# Patient Record
Sex: FEMALE | Race: WHITE | NOT HISPANIC OR LATINO | Employment: FULL TIME | URBAN - METROPOLITAN AREA
[De-identification: names, ages, dates, MRNs, and addresses within clinical notes are randomized per-mention and may not be internally consistent; named-entity substitution may affect disease eponyms.]

---

## 2017-09-07 ENCOUNTER — OFFICE VISIT (OUTPATIENT)
Dept: URGENT CARE | Facility: CLINIC | Age: 22
End: 2017-09-07
Payer: COMMERCIAL

## 2017-09-07 VITALS
WEIGHT: 120 LBS | DIASTOLIC BLOOD PRESSURE: 68 MMHG | HEART RATE: 66 BPM | OXYGEN SATURATION: 100 % | HEIGHT: 65 IN | RESPIRATION RATE: 18 BRPM | TEMPERATURE: 98 F | BODY MASS INDEX: 19.99 KG/M2 | SYSTOLIC BLOOD PRESSURE: 102 MMHG

## 2017-09-07 DIAGNOSIS — S90.851A FOREIGN BODY IN RIGHT FOOT, INITIAL ENCOUNTER: Primary | ICD-10-CM

## 2017-09-07 PROCEDURE — 3008F BODY MASS INDEX DOCD: CPT | Mod: S$GLB,,, | Performed by: NURSE PRACTITIONER

## 2017-09-07 PROCEDURE — 99203 OFFICE O/P NEW LOW 30 MIN: CPT | Mod: S$GLB,,, | Performed by: NURSE PRACTITIONER

## 2017-09-07 RX ORDER — MUPIROCIN 20 MG/G
OINTMENT TOPICAL
Qty: 22 G | Refills: 1 | Status: SHIPPED | OUTPATIENT
Start: 2017-09-07 | End: 2018-05-15

## 2017-09-07 NOTE — PATIENT INSTRUCTIONS
Foreign Object Under the Skin (Removed)  An object has been removed from under your skin. Although care was taken to remove all of it, there is always a chance that a small piece may have been left behind.  Most skin wounds heal without problems. But there can be an increased risk of infection if anything stays under the skin. Sometimes the pieces work their way out on their own, and sometimes they can cause an infection. Very small pieces that stay under the skin usually dont cause a problem or need further treatment.  Home care  Wound care  · Keep the wound clean and dry.  · If there is a dressing or bandage, change it when it gets wet or dirty. Otherwise, leave it on for the first 24 hours, then change it once a day or as often as you were instructed.  · If stitches or staples were used, clean the wound every day:  ¨ After taking off the dressing, wash the area gently with soap and water.  ¨ Apply a thin layer of antibiotic ointment to the cut. This will keep the wound clean and make it easier to remove the stitches. If it is oozing a lot, you can put a nonstick dressing over it. Then reapply the bandage or dressing as you were instructed.  ¨ You can get it wet, just like when you clean it. This means you can shower as usual for the first 24 hours, but do not soak the area in water (no baths or swimming) until the stitches or staples are taken out.  · If surgical tape or strips were used, keep the area clean and dry. If it becomes wet, blot it dry with a towel.    Medicine  · You can take over-the-counter medicine for pain, unless you were given a different pain medicine to use. If you have chronic liver or kidney disease or ever had a stomach ulcer, or gastrointestinal bleeding or are taking blood thinner medicines, talk with your healthcare provider before using these medicines.  · If you were given antibiotics, take them until they are used up. It is important to finish the antibiotics even if the wound  looks better to make sure the infection clears.  Follow-up care  Follow up your healthcare provider, or as advised. Keep in mind the following:  · Watch for any signs of infection, such as increasing pain, redness, swelling, or pus drainage. If this happens, dont wait for your scheduled visit, rather see your healthcare provider sooner.  · Stitches or staples are usually taken out within 5 to 14 days. This varies depending on what part of your body they are on, and the type of wound. The healthcare provider will tell you how long they should be left in.  · If surgical tape or strips were used, they are usually left on for 7 to 10 days. You can remove them after that unless you were told otherwise. If you try to remove them, and it is too difficult, soaking can help. If the edges of the cut pull apart, then stop removing the tape, and follow up with your healthcare provider.  · If X-rays were taken, you will be told if there are new findings that may affect your care.  When to seek medical care  Call your healthcare provider right away if any of these occur:  · Fever of 100.4ºF (38ºC) or higher, or as directed by your healthcare provider  · Increasing pain in the wound  · Redness, swelling or pus coming from the wound  Date Last Reviewed: 10/1/2016  © 6455-9530 [employer]. [address] [place] [address], [place] [address]. All rights reserved. This information is not intended as a substitute for professional medical care. Always follow your healthcare professional's instructions.

## 2017-09-07 NOTE — PROGRESS NOTES
Subjective:       Patient ID: Dori Wynne is a 21 y.o. female.    Chief Complaint: Foreign Body (RT FOOT )  glass in RT   PATIENT REPORTS THAT A GLASS BOWL BROKE AND SHE WAS WALKING BAREFOOT AND A PIECE OF GLASS STUCK IN HER FOOT      Foreign Body   The incident occurred 1 to 3 hours ago. Suspected object: RT FOOT  The incident was witnessed/reported by the patient. Pertinent negatives include no difficulty breathing, drainage, fever or sore throat. There is no history of a prior foreign body removal.     Review of Systems   Constitution: Negative for chills and fever.   HENT: Negative for sore throat.    Respiratory: Negative for shortness of breath.    Skin: Negative for itching and rash.   Musculoskeletal: Negative for joint pain.       Objective:      Physical Exam   Constitutional: She is oriented to person, place, and time. She appears well-developed and well-nourished.   HENT:   Head: Normocephalic and atraumatic. Head is without abrasion, without contusion and without laceration.   Right Ear: External ear normal.   Left Ear: External ear normal.   Nose: Nose normal.   Mouth/Throat: Oropharynx is clear and moist.   Eyes: Conjunctivae, EOM and lids are normal. Pupils are equal, round, and reactive to light.   Neck: Trachea normal, full passive range of motion without pain and phonation normal. Neck supple.   Cardiovascular: Normal rate, regular rhythm and normal heart sounds.    Pulmonary/Chest: Effort normal and breath sounds normal. No stridor. No respiratory distress.   Musculoskeletal: Normal range of motion.        Right foot: There is tenderness.        Feet:    Neurological: She is alert and oriented to person, place, and time.   Skin: Skin is warm, dry and intact. Capillary refill takes less than 2 seconds. No abrasion, no bruising, no burn, no ecchymosis, no laceration, no lesion and no rash noted. No erythema.   Psychiatric: She has a normal mood and affect. Her speech is normal and  behavior is normal. Judgment and thought content normal. Cognition and memory are normal.   Nursing note and vitals reviewed.      Assessment:       1. Foreign body in right foot, initial encounter        Plan:       Dori was seen today for foreign body.    Diagnoses and all orders for this visit:    Foreign body in right foot, initial encounter  -     mupirocin (BACTROBAN) 2 % ointment; Apply to affected area 2 times daily    Please return here or go to the Emergency Department for any concerns or worsening of condition.  If you were prescribed antibiotics, please take them to completion.  If you were prescribed a narcotic medication, do not drive or operate heavy equipment or machinery while taking these medications.  Please follow up with your primary care doctor or specialist as needed.    If you  smoke, please stop smoking.

## 2017-09-13 ENCOUNTER — TELEPHONE (OUTPATIENT)
Dept: URGENT CARE | Facility: CLINIC | Age: 22
End: 2017-09-13

## 2018-05-15 ENCOUNTER — CLINICAL SUPPORT (OUTPATIENT)
Dept: CARDIOLOGY | Facility: CLINIC | Age: 23
End: 2018-05-15
Payer: COMMERCIAL

## 2018-05-15 ENCOUNTER — OFFICE VISIT (OUTPATIENT)
Dept: CARDIOLOGY | Facility: CLINIC | Age: 23
End: 2018-05-15
Payer: COMMERCIAL

## 2018-05-15 VITALS
HEART RATE: 50 BPM | WEIGHT: 127.44 LBS | DIASTOLIC BLOOD PRESSURE: 62 MMHG | OXYGEN SATURATION: 100 % | BODY MASS INDEX: 21.23 KG/M2 | HEIGHT: 65 IN | SYSTOLIC BLOOD PRESSURE: 101 MMHG

## 2018-05-15 DIAGNOSIS — R01.1 UNDIAGNOSED CARDIAC MURMURS: ICD-10-CM

## 2018-05-15 DIAGNOSIS — R01.1 UNDIAGNOSED CARDIAC MURMURS: Primary | ICD-10-CM

## 2018-05-15 PROCEDURE — 93303 ECHO TRANSTHORACIC: CPT | Mod: S$GLB,,, | Performed by: PEDIATRICS

## 2018-05-15 PROCEDURE — 93325 DOPPLER ECHO COLOR FLOW MAPG: CPT | Mod: S$GLB,,, | Performed by: PEDIATRICS

## 2018-05-15 PROCEDURE — 93000 ELECTROCARDIOGRAM COMPLETE: CPT | Mod: 59,S$GLB,, | Performed by: PEDIATRICS

## 2018-05-15 PROCEDURE — 93320 DOPPLER ECHO COMPLETE: CPT | Mod: S$GLB,,, | Performed by: PEDIATRICS

## 2018-05-15 PROCEDURE — 99204 OFFICE O/P NEW MOD 45 MIN: CPT | Mod: S$GLB,,, | Performed by: PEDIATRICS

## 2018-05-15 PROCEDURE — 3008F BODY MASS INDEX DOCD: CPT | Mod: CPTII,S$GLB,, | Performed by: PEDIATRICS

## 2018-05-15 NOTE — PROGRESS NOTES
"  To Dr Lencho Buckner:          HISTORY OF PRESENT ILLNESS:  Dori Wynne, is a 22 -year-old female, who has been in good health.  She is a senior attending [employer].  Dori comes to clinic today with her mother, for evaluation of a heart murmur. She has no ongoing medical problems, except for occasional lateral left-rib pain.  She is active physically and runs cross country.  Recently, on a routine physical examination, you heard a heart murmur. Dori reports no problem with SOB, breathing or chest pain.  She does experience occasional palpitations, and, in May, had a single episode of a short-lived but abnormally-rapid heart rate.  There was no associated lightheadedness or syncope. She does not take energy drinks or excessive caffeinated sodas.  Mom does report that as a child, Dori at times complained of "her heart beating fast".          REVIEW OF SYSTEMS:  There are no visual disturbances or hearing deficits.  No seizures.  No headaches. No problem with excessive coughing, wheezing or URIs.  No swallowing difficulty.  No abdominal pain and bowel movements are normal.  No pelvic pain and urination is normal. No DM or thyroid disease. No bruising or bleeding. No known intrinsic problem with the lungs, liver or kidneys. No arterial disease, HBP or lipid disorder. No FH of rhythm problems or congenital heart disease.             PHYSICAL EXAMINATION:   GENERAL:  A healthy-looking pleasant 22 -year-old, who is noncyanotic and in no distress. VITAL SIGNS:  Her weight is 57.8 kg or 127 pounds 7 ounces.  Her height is 1.651 meters or 5 feet 5 inches.  Heart rate is 50 beats per minute and regular. Pulse oximetry is normal at 100 %.  Blood pressure in the right arm is 101/62.  Color and perfusion are good.  HEENT:  There are normal eye movements. There are no bruits over the head.  The mucous membranes are moist and pink.  There is no adenopathy.  No jugular venous distention. NECK:  Supple. The thyroid is not enlarged. No " carotid bruits.   CHEST:  Normal chest movements.  Breath sounds are good bilaterally.  Good air entry.  No wheezes, rhonchi, or rales.  CARDIOVASCULAR:  Normal precordial activity.  S1 is normal and S2 is split. There is a grade I/VI vibratory systolic ejection murmur heard in the periapical region.  Diastole is clear.  There is no rub, gallop or click.  ABDOMEN:  Liver edge is at the right costal margin and nontender.  ? Spleen.  No masses are palpable.  Bowel sounds are normal.  EXTREMITIES:  Pulses are 3+ throughout.  Capillary refill is good.  There is no peripheral edema.  No joint pain. No bruising, cyanosis or clubbing.           ............................................................................................................................................................           ECG:  Sinus bradycardia with a ventricular rate is 54 bpm. Note,  rsr' in V1 and V2 suggestive of an RV conduction delay. Normal T waves (early repolarization).           ECHO:    No pericardial effusion. There are four normally related cardiac chambers.  No clots or vegetations.  No atrial septal defect or ventricular septal defect.  No RVOT or LVOT obstruction. The cardiac valves appear normal. No MVP.  No arch obstruction.  M-Mode Measurements. The left ventricular internal diastolic dimension is 4.6 cm.  The right ventricle is 1.8 cm.  The interventricular septal thickness during diastole is 0.7 cm and the posterior wall is 0.9 cm.  Ejection fraction is completely normal at 66 %.  The Ao root is 2.6 cm. The LA is 3.4 cm.  All are normal.  Doppler Analysis:  There is a mild degree of tricuspid insufficiency.  Peak pressure drop is ~ 23 mmHg. Trace pulmonary insufficiency.  Peak pressure drop across the pulmonary valve is 4 mmHg.  No mitral insufficiency. There no aortic insufficiency. Peak pressure drop across the aortic valve is 7 mmHg.   No PDA seen.   PVs are to the  LA.        ...................................................................................................................................................................           ASSESSMENT:  In summary, we have a 22-year-old female [employer], Dori Wynne,  who has been in good health. She is seen today, 5/15/18, for evaluation of a heart murmur. She is active physically and has no limitations.  At today's visit, I agree she has a heart murmur.  It appeats to be functional.  However, her ECG was somewhat abnormal, with an rsr' in V1 and V2, suggesting a conduction delay across the RV. This pattern can sometimes be seen with an ASD.  Although, it can be a variant of normal. Also, given her complaint of occasional palpitations, an ECHO was performed. It showed normal intracardiac anatomy, with no ASD and the RV is not dilated.  These findings are consistent with a functional murmur.              PLAN:  1.  Antibiotic prophylaxis is not required in this situation.   2.  I have told Dori and mom to please inform us if the palpitations and/or a rapid HR persist.  If so, we can provide them with an Event Recorder.   If there are any questions concerning the cardiac status of this patient, please feel free to contact us.  Thank you so much for allowing us to partake in the care of your patient.  Sincerely Juan Conroy PhD, MD.

## 2018-05-16 ENCOUNTER — OFFICE VISIT (OUTPATIENT)
Dept: GASTROENTEROLOGY | Facility: CLINIC | Age: 23
End: 2018-05-16
Payer: COMMERCIAL

## 2018-05-16 ENCOUNTER — TELEPHONE (OUTPATIENT)
Dept: TRANSPLANT | Facility: CLINIC | Age: 23
End: 2018-05-16

## 2018-05-16 VITALS
WEIGHT: 125.69 LBS | SYSTOLIC BLOOD PRESSURE: 108 MMHG | HEART RATE: 57 BPM | BODY MASS INDEX: 20.94 KG/M2 | HEIGHT: 65 IN | DIASTOLIC BLOOD PRESSURE: 62 MMHG

## 2018-05-16 DIAGNOSIS — R10.12 LEFT UPPER QUADRANT PAIN: ICD-10-CM

## 2018-05-16 DIAGNOSIS — R93.2 ABNORMAL LIVER ULTRASOUND: Primary | ICD-10-CM

## 2018-05-16 PROCEDURE — 99999 PR PBB SHADOW E&M-EST. PATIENT-LVL III: CPT | Mod: PBBFAC,,, | Performed by: INTERNAL MEDICINE

## 2018-05-16 PROCEDURE — 99203 OFFICE O/P NEW LOW 30 MIN: CPT | Mod: S$GLB,,, | Performed by: INTERNAL MEDICINE

## 2018-05-16 NOTE — PROGRESS NOTES
Subjective:      Patient ID: Dori Wynne is a 22 y.o. female.    Chief Complaint: lesion in liver    HPI:   Patient a 22-year-old female presents for GI opinion.  Due to some intermittent left upper quadrant discomfort she will underwent an ultrasound the abdomen.  A small echogenic lesion in the right hepatic lobe was noted.  It was felt to most likely represent a hemangioma.  No other significant abdomen ALLERGIES.  Patient [place] on [place] trip in [facility]  Patient's abdominal discomfort is intermittent and never severe.  Her past medical history is unremarkable.  Liver enzymes are essentially normal.  CBC normal.  C-reactive protein normal.  Nonsmoker.  Nondrinker.  Family history positive for colon cancer in an aunt and uncle.    Review of patient's allergies indicates:  No Known Allergies  Past Medical History:   Diagnosis Date    Vaginal spotting age 11     Past Surgical History:   Procedure Laterality Date    mole removed      wisdom       Family History   Problem Relation Age of Onset    Hypertension Maternal Grandmother     Diabetes Maternal Grandfather     Hypertension Maternal Grandfather     Cancer Maternal Grandfather         pancreatis cancer    Colon polyps Maternal Grandfather     Pancreatic cancer Maternal Grandfather     Hypertension Father     Hypertension Mother     Diabetes Maternal Aunt     Cancer Maternal Aunt     Colon cancer Maternal Aunt     Stomach cancer Maternal Aunt     Diabetes Cousin     Cancer Cousin     Colon cancer Maternal Uncle     Kidney cancer Maternal Uncle     Lymphoma Maternal Aunt      labor Neg Hx     Miscarriages / Stillbirths Neg Hx      Social History     Social History    Marital status: Single     Spouse name: N/A    Number of children: N/A    Years of education: N/A     Occupational History    Not on file.     Social History Main Topics    Smoking status: Never Smoker    Smokeless  "tobacco: Never Used    Alcohol use No    Drug use: No    Sexual activity: No     Other Topics Concern    Not on file     Social History Narrative    No narrative on file       Review of Systems:  Constitutional: Negative for appetite change.   HENT: Negative for trouble swallowing.   Eyes: Negative for photophobia.   Respiratory: Negative for cough and shortness of breath.   Cardiovascular: Positive for palpitations.  She has undergone a cardiac echo  Gastrointestinal: See HPI for details.  Genitourinary: Negative for frequency and hematuria.   Skin: Negative for rash.   Neurological: Negative for weakness and headaches.   Hematological: Negative.   Psychiatric/Behavioral: Negative for suicidal ideas and behavioral problems.     Objective:     /62 (BP Location: Right arm, Patient Position: Sitting)   Pulse (!) 57   Ht 5' 5" (1.651 m)   Wt 57 kg (125 lb 10.6 oz)   BMI 20.91 kg/m²     Physical Exam:  Alert no distress.  Eyes: Pupils are equal, round, and reactive to light.   Neck: Supple. No mass  Cardiovascular: Regular rhythm . No murmur   Pulmonary/Chest: Lungs clear   Abdominal: Soft. No mass palpated. Nontender, no guarding. Positive bowel sounds   Musculoskeletal: No deformity. No edema.   Psychiatric: Alert and oriented    Assessment:     1. Abnormal liver ultrasound    2. Left upper quadrant pain      Plan:     Dori was seen today for lesion in liver.    Diagnoses and all orders for this visit:    Abnormal liver ultrasound    Left upper quadrant pain      Plan:  Reassurance.  Repeat ultrasound in 3-6 months to assure stability of the lesion.  I discussed this recommendation at length with the patient and her mother.  Provided a pamphlet on precautions for avoidance of traveler's diarrhea    "

## 2018-05-16 NOTE — TELEPHONE ENCOUNTER
----- Message from Shannan Garcia sent at 5/16/2018  3:04 PM CDT -----  Contact: Mother-   Returned call to pt mother , but there was no answer. LVM for her to call back; the pt is [facility]ed to see [facility] for the liver lesion, but GI ussally do not take care of problems with the liver.  ----- Message -----  From: Jaun Santo  Sent: 5/16/2018  11:42 AM  To: [facility] Referral Pool    Schedule Appointment.     Reason: (I.e. Caller declined first available, appointment listed below. Caller will not accept being placed on the waitlist and is requesting a message be sent to doctor, etc.)    Name of Caller:Mother   When is the first available appointment?n/a  Symptoms:n/a  Communication Preference (MyChart response to Pt. (or) Call Back # and timeframe):  [phone]   Additional Information:Would like someone to call her, regarding patient lesion on liver. She will be a NP with [employer], states she will MD top get patient referral faxed.

## 2018-05-16 NOTE — PATIENT INSTRUCTIONS
Abdominal Ultrasound  An abdominal ultrasound is an imaging test that uses sound waves to form pictures of your abdominal organs. It can help find organ problems, such as gallstones, kidney stones, or liver disease. Ultrasound does not use ionizing radiation and does not have any known risks. It can also see many blood vessels in the abdomen. If needed as part of your exam, the blood flow in these blood vessels can also be evaluated.  Before your test  · What you need to do to get ready for the test depends on the area of your body that will be looked at. Follow any directions youre given for not eating or drinking before the procedure. Your healthcare provider will give you instructions if required.  · Follow all other instructions given by your provider.  For best results, be prepared to answer questions about your medical history, including the following:  · Previous abdominal surgery  · Previous abdominal imaging tests, including ultrasound, CT, or MRI studies  During your test  · You may be asked to put on a gown.  · You will lie on an exam table with your abdomen exposed.  · A nongreasy gel will be put on your skin.  · The sonographer will use a handheld probe (transducer) against your abdomen. This probe helps create images of your abdominal organs.  · You may see the pictures of your organs on screen.  · Certain organs, like the liver, can be biopsied during an ultrasound. This will require additional steps and your provider can discuss these details with you.  The person who performs the ultrasound is called a sonographer. The sonographer can answer questions about the test. But only a doctor can explain the results.    Your test results  Your healthcare provider will discuss the test results with you during a follow-up visit or over the phone. Your next appointment is: _________________  Date Last Reviewed: 2/1/2017  © 3961-6927 [employer]. [address] [place] [address], [place] [address]. All  rights reserved. This information is not intended as a substitute for professional medical care. Always follow your healthcare professional's instructions.

## 2020-05-14 ENCOUNTER — LAB VISIT (OUTPATIENT)
Dept: PRIMARY CARE CLINIC | Facility: CLINIC | Age: 25
End: 2020-05-14
Payer: COMMERCIAL

## 2020-05-14 DIAGNOSIS — R05.9 COUGH: Primary | ICD-10-CM

## 2020-05-14 PROCEDURE — U0002 COVID-19 LAB TEST NON-CDC: HCPCS

## 2020-05-15 LAB — SARS-COV-2 RNA RESP QL NAA+PROBE: NOT DETECTED

## 2022-03-29 ENCOUNTER — TELEPHONE (OUTPATIENT)
Dept: OBSTETRICS AND GYNECOLOGY | Facility: CLINIC | Age: 27
End: 2022-03-29
Payer: COMMERCIAL

## 2022-03-29 NOTE — TELEPHONE ENCOUNTER
----- Message from Vee Paredes LPN sent at 3/29/2022 11:38 AM CDT -----  Regarding: FW: Patient call back  Is this appointment considered a procedure or a birth control consultation (15 or 30 min)?  ----- Message -----  From: Sia Nesbitt  Sent: 3/29/2022  11:06 AM CDT  To: Armida SCALES Staff  Subject: Patient call back                                Who called: Caron (mother)     What is the request in detail: Patient is requesting a call back. Patient needs a diaphragm fit and would like to discuss how long that would take to get in. She would like to further discuss.   Please advise.    Can the clinic reply by [facility]? No    Best call back number: [phone]    Additional Information: N/A

## 2022-03-29 NOTE — TELEPHONE ENCOUNTER
Called patient no answer to assist with scheduling under a procedure visit with Dr Huffman for a diaphragm fit.

## 2022-05-16 ENCOUNTER — TELEPHONE (OUTPATIENT)
Dept: OBSTETRICS AND GYNECOLOGY | Facility: CLINIC | Age: 27
End: 2022-05-16
Payer: COMMERCIAL

## 2022-05-16 NOTE — TELEPHONE ENCOUNTER
Called and spoke with patients mother about patient wanting to get fitted for a diaphragm at 06/06/22 visit. Informed patient that yes Dr Huffman does do fitting and will discuss if this can happen at her first new patient appointment and will let her know.

## 2022-05-16 NOTE — TELEPHONE ENCOUNTER
----- Message from Stella Islas sent at 5/16/2022  2:34 PM CDT -----  Regarding: questions and concerns  Name of Who is Calling: Santi, mother           What is the request in detail: Santi is requesting a call in regards to patient getting fit for diaphragm. She want to know if it can done the same day as her appointment. She want to change the appointment as well.           Can the clinic reply by [facility]:No            What Number to Call Back if not in [facility]: [phone]

## 2022-05-17 ENCOUNTER — TELEPHONE (OUTPATIENT)
Dept: OBSTETRICS AND GYNECOLOGY | Facility: CLINIC | Age: 27
End: 2022-05-17
Payer: COMMERCIAL

## 2022-05-17 NOTE — TELEPHONE ENCOUNTER
----- Message from Aparna Eugene sent at 5/17/2022  2:15 PM CDT -----  Regarding: Return Call  Name of Who is Calling:Caron Wynne (Mother)           What is the request in detail: Patient mother would like a call back           Can the clinic reply by [facility]: No           What Number to Call Back [facility] not [facility]:[phone]

## 2022-06-06 ENCOUNTER — OFFICE VISIT (OUTPATIENT)
Dept: OBSTETRICS AND GYNECOLOGY | Facility: CLINIC | Age: 27
End: 2022-06-06
Attending: OBSTETRICS & GYNECOLOGY
Payer: COMMERCIAL

## 2022-06-06 VITALS
HEART RATE: 59 BPM | HEIGHT: 65 IN | DIASTOLIC BLOOD PRESSURE: 73 MMHG | WEIGHT: 121.63 LBS | SYSTOLIC BLOOD PRESSURE: 116 MMHG | BODY MASS INDEX: 20.26 KG/M2

## 2022-06-06 DIAGNOSIS — Z30.09 GENERAL COUNSELING AND ADVICE ON FEMALE CONTRACEPTION: Primary | ICD-10-CM

## 2022-06-06 PROCEDURE — 3008F PR BODY MASS INDEX (BMI) DOCUMENTED: ICD-10-PCS | Mod: CPTII,S$GLB,, | Performed by: OBSTETRICS & GYNECOLOGY

## 2022-06-06 PROCEDURE — 1159F MED LIST DOCD IN RCRD: CPT | Mod: CPTII,S$GLB,, | Performed by: OBSTETRICS & GYNECOLOGY

## 2022-06-06 PROCEDURE — 3078F PR MOST RECENT DIASTOLIC BLOOD PRESSURE < 80 MM HG: ICD-10-PCS | Mod: CPTII,S$GLB,, | Performed by: OBSTETRICS & GYNECOLOGY

## 2022-06-06 PROCEDURE — 1160F RVW MEDS BY RX/DR IN RCRD: CPT | Mod: CPTII,S$GLB,, | Performed by: OBSTETRICS & GYNECOLOGY

## 2022-06-06 PROCEDURE — 3078F DIAST BP <80 MM HG: CPT | Mod: CPTII,S$GLB,, | Performed by: OBSTETRICS & GYNECOLOGY

## 2022-06-06 PROCEDURE — 99213 OFFICE O/P EST LOW 20 MIN: CPT | Mod: S$GLB,,, | Performed by: OBSTETRICS & GYNECOLOGY

## 2022-06-06 PROCEDURE — 3074F SYST BP LT 130 MM HG: CPT | Mod: CPTII,S$GLB,, | Performed by: OBSTETRICS & GYNECOLOGY

## 2022-06-06 PROCEDURE — 99999 PR PBB SHADOW E&M-EST. PATIENT-LVL III: ICD-10-PCS | Mod: PBBFAC,,, | Performed by: OBSTETRICS & GYNECOLOGY

## 2022-06-06 PROCEDURE — 99213 PR OFFICE/OUTPT VISIT, EST, LEVL III, 20-29 MIN: ICD-10-PCS | Mod: S$GLB,,, | Performed by: OBSTETRICS & GYNECOLOGY

## 2022-06-06 PROCEDURE — 99999 PR PBB SHADOW E&M-EST. PATIENT-LVL III: CPT | Mod: PBBFAC,,, | Performed by: OBSTETRICS & GYNECOLOGY

## 2022-06-06 PROCEDURE — 1159F PR MEDICATION LIST DOCUMENTED IN MEDICAL RECORD: ICD-10-PCS | Mod: CPTII,S$GLB,, | Performed by: OBSTETRICS & GYNECOLOGY

## 2022-06-06 PROCEDURE — 3074F PR MOST RECENT SYSTOLIC BLOOD PRESSURE < 130 MM HG: ICD-10-PCS | Mod: CPTII,S$GLB,, | Performed by: OBSTETRICS & GYNECOLOGY

## 2022-06-06 PROCEDURE — 3008F BODY MASS INDEX DOCD: CPT | Mod: CPTII,S$GLB,, | Performed by: OBSTETRICS & GYNECOLOGY

## 2022-06-06 PROCEDURE — 1160F PR REVIEW ALL MEDS BY PRESCRIBER/CLIN PHARMACIST DOCUMENTED: ICD-10-PCS | Mod: CPTII,S$GLB,, | Performed by: OBSTETRICS & GYNECOLOGY

## 2022-06-06 NOTE — PROGRESS NOTES
Subjective:       Patient ID: Dori Wynne is a 26 y.o. female.    Chief Complaint:  Contraception      History of Present Illness  HPI  Contraception Counseling  This 25 y/o P0 presents today asking for a Diaphragm fitting.       She is here with her Mother, and was seen in March by another Gynecologist with multiple complaints, as well as a discussion of contraception, and declined exam due to possible discomfort as she has never yet been sexually active. Patient presents for contraception counseling. . Pertinent past medical history: none.  She was given a RX for the Caya, which she filled, but has not yet tried to use/insert.   She /her mother state they have relatives who have done well with use of the Diaphragm, and we discussed that the Caya is equally efficacious, and perhaps easier to insert.  Advised that if the Caya is not comfortable or is difficult to insert, will then consider scheduling for a fitting after she has become sexually active. If unable to use the Caya, advised on use of condoms until other option is decided.     She is very adamant against use of hormonal types of contraception and has concerns about minimal thrombotic risks, as well as concerns about mood disorders.     The possible increase in vaginal and urinary tract infections with Diaphragm use was discussed. Insertion instructions were discussed in detail.  Inspect Diaphragm for holes by holding it up to a light.  Squeeze 1-2 tsp of contraceptive jelly or cream and rub into Diaphragm dome and rim.  In supine, squatting or standing position, fold Diaphragm in half, holding Diaphragm dome side down to keep jelly or cream in.  Hold vulva open with one hand, gently slide Diaphragm into vagina, placing index finger on rim to guide it, aiming toward the back and use index finger to push rim behind pubic bone.  Check that Diaphragm is behind pubic bone and posterior rim behind cervix.  Leave Diaphragm in for 6 hours after  "intercourse.  For repeated intercourse, insert more contraceptive jelly or cream and leave in diaphragm for 6 hours after last intercourse.  Remove by hooking the rim of Diaphragm with index finger and pulling down and out.  Report increased vaginal or urinary tract infections, latex or spermicide allergies.        GYN & OB History  Patient's last menstrual period was 2022.   Date of Last Pap: No result found    OB History    Para Term  AB Living   0 0 0 0 0 0   SAB IAB Ectopic Multiple Live Births   0 0 0 0         Past Medical History:   Diagnosis Date    Vaginal spotting age 11       Past Surgical History:   Procedure Laterality Date    mole removed      wisdom         Review of Systems  Review of Systems   Genitourinary: Negative for menorrhagia and menstrual problem.           Objective:      /73 (BP Location: Left arm, Patient Position: Sitting, BP Method: Medium (Automatic))   Pulse (!) 59   Ht 5' 5" (1.651 m)   Wt 55.2 kg (121 lb 9.6 oz)   LMP 2022   BMI 20.24 kg/m²   Physical Exam         Assessment:        1. General counseling and advice on female contraception            Plan:      1. General counseling and advice on female contraception    Follow up if symptoms worsen or fail to improve.         "

## 2022-06-14 ENCOUNTER — PATIENT MESSAGE (OUTPATIENT)
Dept: OBSTETRICS AND GYNECOLOGY | Facility: CLINIC | Age: 27
End: 2022-06-14
Payer: COMMERCIAL

## 2025-06-25 ENCOUNTER — OFFICE VISIT (OUTPATIENT)
Dept: FAMILY MEDICINE | Facility: CLINIC | Age: 30
End: 2025-06-25
Payer: COMMERCIAL

## 2025-06-25 VITALS
SYSTOLIC BLOOD PRESSURE: 104 MMHG | TEMPERATURE: 98 F | HEIGHT: 65 IN | DIASTOLIC BLOOD PRESSURE: 72 MMHG | WEIGHT: 121.69 LBS | HEART RATE: 97 BPM | OXYGEN SATURATION: 97 % | BODY MASS INDEX: 20.28 KG/M2

## 2025-06-25 DIAGNOSIS — H61.23 BILATERAL IMPACTED CERUMEN: ICD-10-CM

## 2025-06-25 DIAGNOSIS — Z82.49 FAMILY HISTORY OF INTRACRANIAL ANEURYSMS: Primary | ICD-10-CM

## 2025-06-25 DIAGNOSIS — R09.82 POSTNASAL DRIP: ICD-10-CM

## 2025-06-25 DIAGNOSIS — R51.9 ACUTE NONINTRACTABLE HEADACHE, UNSPECIFIED HEADACHE TYPE: ICD-10-CM

## 2025-06-25 DIAGNOSIS — R09.81 SINUS CONGESTION: ICD-10-CM

## 2025-06-25 PROCEDURE — 99204 OFFICE O/P NEW MOD 45 MIN: CPT | Mod: S$GLB,,, | Performed by: INTERNAL MEDICINE

## 2025-06-25 PROCEDURE — 3074F SYST BP LT 130 MM HG: CPT | Mod: CPTII,S$GLB,, | Performed by: INTERNAL MEDICINE

## 2025-06-25 PROCEDURE — 3008F BODY MASS INDEX DOCD: CPT | Mod: CPTII,S$GLB,, | Performed by: INTERNAL MEDICINE

## 2025-06-25 PROCEDURE — 3078F DIAST BP <80 MM HG: CPT | Mod: CPTII,S$GLB,, | Performed by: INTERNAL MEDICINE

## 2025-06-25 PROCEDURE — 99999 PR PBB SHADOW E&M-EST. PATIENT-LVL III: CPT | Mod: PBBFAC,,, | Performed by: INTERNAL MEDICINE

## 2025-06-25 NOTE — PROGRESS NOTES
Patient ID: Dori Wynne is a 29 y.o. female.    Chief Complaint: Headache, Sore Throat, and Sinusitis      History of Present Illness    - Ms. Wynne presents with concerns about headaches in the upper back area of the head, along with sinus problems and sore throat, particularly worried due to family history of aneurysms.    Ms. Wynne reports headaches in the upper back area of her head for about 2 weeks. The headaches are throbbing and particularly noticeable when talking or swallowing. She emphasizes that she normally does not have headaches, which has heightened her awareness of these symptoms. She also has sinus problems and a sore throat on the right side during this period. She is concerned about these symptoms, particularly due to her family history of aneurysms.    She feels pressure and lingering headaches. She has been managing her sinus symptoms with tea, water, and honey, and took one dose of mucus relief medication today. She has not been taking any medication for the headaches. She mentions having low blood pressure, which she has been monitoring. She also reports having some impacted earwax, although this does not seem to be directly related to her primary concerns.    She wonders if symptoms could be related to stress or if they are all connected. She also mentions having separate neck pain when looking up, which she distinguishes from her current headache symptoms.    She denies taking any pain medication for her headaches, such as Tylenol or ibuprofen.      ROS:  General: denies fever, denies chills, denies fatigue, denies weight gain, denies weight loss  Eyes: denies vision changes, denies redness, denies discharge  ENT: denies ear pain, denies nasal congestion, complains of sore throat, complains of sinus pressure, complains of post nasal drip  Cardiovascular: denies chest pain, denies palpitations, denies lower extremity edema, complains of orthostatic symptoms  Respiratory: denies  "cough, denies shortness of breath  Gastrointestinal: denies abdominal pain, denies nausea, denies vomiting, denies diarrhea, denies constipation, denies blood in stool  Genitourinary: denies dysuria, denies hematuria, denies frequency  Musculoskeletal: denies joint pain, denies muscle pain, complains of pain with movement, complains of neck pain  Skin: denies rash, denies lesion  Neurological: complains of headache, denies dizziness, denies numbness, denies tingling  Psychiatric: denies anxiety, denies depression, denies sleep difficulty  Head: complains of head pain, complains of difficulty swallowing            Physical Exam    General: In no acute distress.  Head: Normocephalic. Non traumatic.  Eyes: PERRLA. EOMs full. Conjunctivae clear. Fundi grossly normal.  Ears: IMPACTED CERUMEN. TMs normal.  Nose: Mucosa pink. Mucosa moist. No obstruction.  Throat: Clear. No exudates. No lesions. POST-NASAL DRIP.  Neck: Supple. No masses. No thyromegaly. No bruits.  Chest: Lungs clear. No rales. No rhonchi. No wheezes.  Heart: RRR. No murmurs. No rubs. No gallops.  Abdomen: Soft. No tenderness. No masses. BS normal.  : Normal external genitalia. No lesions. No discharge. No hernias  noted.  Back: Normal curvature. No scoliosis. No tenderness.  Extremities: Warm. Well perfused. No upper extremity edema. No lower extremity edema. FROM. No deformities. No joint erythema.  Neuro: No focal deficits appreciated. Good muscle tone. Normal response to visual stimuli. Normal response to auditory stimuli.  Skin: Normal. No rashes. No lesions noted.          Current Medications[1]            VITAL SIGNS:  Vitals:    06/25/25 1058   BP: 104/72   Pulse: 97   Temp: 97.5 °F (36.4 °C)   TempSrc: Temporal   SpO2: 97%   Weight: 55.2 kg (121 lb 11.1 oz)   Height: 5' 5" (1.651 m)       CURRENT BMI:   Body mass index is 20.25 kg/m².    LABS REVIEWED:    CBC:  No results found for: "WBC", "RBC", "HGB", "HCT", "MCV", "MCH", "MCHC", "RDW", "PLT", " ""MPV", "GRAN", "LYMPH", "MONO", "EOS", "BASO", "EOSINOPHIL", "BASOPHIL"    CHEMISTRY:  Albumin   Date Value Ref Range Status   03/03/2025 4.9 4.0 - 5.0 g/dL Final       LIPID PANEL:  No results found for: "CHOL"  No results found for: "TRIG"  No results found for: "HDL"  No results found for: "LDLCALC"    THYROID:  Lab Results   Component Value Date    TSH 1.31 02/24/2025       DIABETES:  No results found for: "LABA1C", "HGBA1C"  No results found for: "MICALBCREAT"    Assessment and Plan     Assessment & Plan    Z82.49 Family history of intracranial aneurysms  R09.81 Sinus congestion  R51.9 Acute nonintractable headache, unspecified headache type  H61.23 Bilateral impacted cerumen  R09.82 Postnasal drip    FAMILY HISTORY OF INTRACRANIAL ANEURYSMS:  - Considered family history of aneurysms and current headache symptoms.  - Educated on genetic component of aneurysms and weakness in blood vessel walls that can lead to expansion and rupture.  - Ordered CT Head due to multiple family members with aneurysm history, suggesting potential genetic component.    SINUS CONGESTION:  - Assessed sinus symptoms and determined current treatment approach was ineffective.  - Clarified sinus problems stem from thickened membranes blocking airways, not excess mucus.  - Started Flonase (OTC nasal spray) and Allegra or Zyrtec to shrink nasal mucus membranes and improve sinus drainage.    ACUTE NONINTRACTABLE HEADACHE, UNSPECIFIED HEADACHE TYPE:  - Considered family history of aneurysms and current headache symptoms.  - Explained location of neck muscles in relation to headache symptoms.  - Started Tylenol or ibuprofen for headache relief.  - Explained hypertension is often asymptomatic, contrary to common belief that it causes headaches.    BILATERAL IMPACTED CERUMEN:  - Evaluated ear canals, noting impacted earwax but healthy eardrums.  - Started Debrox to soften and remove impacted earwax.    POSTNASAL DRIP:  - Concluded likely minor " viral infection, explaining current symptoms.  - Informed about nature of viral infections, typically lasting up to 21 days.          1. Family history of intracranial aneurysms  Comments:  Multiple family members with aneurysms. Will order CTA head to evaluate patient's vasculature  Orders:  -     CTA HEAD; Future; Expected date: 06/25/2025    2. Sinus congestion  Comments:  Recommend Allegra or Zyrtec and Flonase    3. Acute nonintractable headache, unspecified headache type  Comments:  Right parietal lobe area. No injury. Recommend Tylenol or Ibuprofen    4. Bilateral impacted cerumen  Comments:  Recommend Debrox to keep earwax soft    5. Postnasal drip  Comments:  Recommend Allegra or Zyrtec and Flonase           No follow-ups on file.  46 of total time spent on the encounter, which includes face to face time and non-face to face time preparing to see the patient. This includes obtaining and/or reviewing separately obtained history, performing a medically appropriate examination and/or evaluation, and counseling and educating the patient/family/caregiver. Includes documenting clinical information in the electronic or other health record, independently interpreting results (not separately reported) and communicating results to the patient/family/caregiver, with care coordination (not separately reported). Medications, tests and/or procedures ordered as necessary along with referring and communicating with other health professionals (when not separately reported).      This note was generated with the assistance of ambient listening technology. Verbal consent was obtained by the patient and accompanying visitor(s) for the recording of patient appointment to facilitate this note. I attest to having reviewed and edited the generated note for accuracy, though some syntax or spelling errors may persist. Please contact the author of this note for any clarification.            [1]   Current Outpatient Medications:      BIOTIN ORAL, Take by mouth., Disp: , Rfl:     calcium carbonate (OS-RAYMUNDO) 600 mg calcium (1,500 mg) Tab, Take 600 mg by mouth once., Disp: , Rfl:     cholecalciferol, vitamin D3, (VITAMIN D3) 50 mcg (2,000 unit) Cap capsule, Take by mouth., Disp: , Rfl:     inulin (PREBIOTIC FIBER ORAL), , Disp: , Rfl:     MULTIVITAMIN ORAL, Take by mouth., Disp: , Rfl:     nifedipine 0.3%-lidocaine 1.5% topical ointment, , Disp: , Rfl:     UNABLE TO FIND, medication name: Vitamin K2, Disp: , Rfl: